# Patient Record
Sex: FEMALE | Race: WHITE | NOT HISPANIC OR LATINO | URBAN - METROPOLITAN AREA
[De-identification: names, ages, dates, MRNs, and addresses within clinical notes are randomized per-mention and may not be internally consistent; named-entity substitution may affect disease eponyms.]

---

## 2025-07-05 ENCOUNTER — OFFICE VISIT (OUTPATIENT)
Dept: URGENT CARE | Age: 60
End: 2025-07-05
Payer: COMMERCIAL

## 2025-07-05 VITALS
TEMPERATURE: 98.3 F | DIASTOLIC BLOOD PRESSURE: 66 MMHG | HEART RATE: 87 BPM | SYSTOLIC BLOOD PRESSURE: 130 MMHG | BODY MASS INDEX: 18.78 KG/M2 | OXYGEN SATURATION: 99 % | WEIGHT: 110 LBS | RESPIRATION RATE: 16 BRPM | HEIGHT: 64 IN

## 2025-07-05 DIAGNOSIS — B02.9 HERPES ZOSTER WITHOUT COMPLICATION: Primary | ICD-10-CM

## 2025-07-05 RX ORDER — VALACYCLOVIR HYDROCHLORIDE 1 G/1
1000 TABLET, FILM COATED ORAL 3 TIMES DAILY
Qty: 21 TABLET | Refills: 0 | Status: SHIPPED | OUTPATIENT
Start: 2025-07-05 | End: 2025-07-12

## 2025-07-05 RX ORDER — IBUPROFEN 600 MG/1
TABLET, FILM COATED ORAL
COMMUNITY
Start: 2025-02-20

## 2025-07-05 ASSESSMENT — PAIN SCALES - GENERAL: PAINLEVEL_OUTOF10: 6

## 2025-07-05 NOTE — PROGRESS NOTES
"Subjective   Patient ID: Isidro Finn is a 59 y.o. female who is here with her . She presents today with a chief complaint of Other (Insect bites that are getting more and more painful, swelling and redness x 11 days). Pain, no pruritus, and fatigue; no other symptoms are reported. She has been applying cortisone cream which is not helping.    History of Present Illness  HPI    Past Medical History  Allergies as of 07/05/2025 - Reviewed 07/05/2025   Allergen Reaction Noted    Sulfa (sulfonamide antibiotics) Other and Unknown 11/19/2022       Prescriptions Prior to Admission[1]     Medical History[2]    Surgical History[3]     reports that she has been smoking cigarettes. She has never used smokeless tobacco.    Review of Systems  Review of Systems                               Objective    Vitals:    07/05/25 1828   BP: 130/66   Pulse: 87   Resp: 16   Temp: 36.8 °C (98.3 °F)   TempSrc: Oral   SpO2: 99%   Weight: 49.9 kg (110 lb)   Height: 1.626 m (5' 4\")     No LMP recorded.    Physical Exam  Vitals and nursing note reviewed.   Constitutional:       General: She is not in acute distress.     Appearance: Normal appearance.   HENT:      Nose: Nose normal.      Mouth/Throat:      Mouth: Mucous membranes are moist.      Pharynx: Oropharynx is clear.   Eyes:      Extraocular Movements: Extraocular movements intact.      Conjunctiva/sclera: Conjunctivae normal.      Pupils: Pupils are equal, round, and reactive to light.   Cardiovascular:      Rate and Rhythm: Normal rate and regular rhythm.      Pulses: Normal pulses.      Heart sounds: Normal heart sounds.   Pulmonary:      Effort: Pulmonary effort is normal.      Breath sounds: Normal breath sounds.   Musculoskeletal:         General: Normal range of motion.      Cervical back: Normal range of motion and neck supple. No rigidity or tenderness.   Lymphadenopathy:      Cervical: No cervical adenopathy.   Skin:     General: Skin is warm.      Comments: " Erythematous vesicles, +tender to palpation, L T7 dermatome   Neurological:      Mental Status: She is alert.         Procedures    Point of Care Test & Imaging Results from this visit  No results found for this visit on 07/05/25.   Imaging  No results found.    Cardiology, Vascular, and Other Imaging  No other imaging results found for the past 2 days      Diagnostic study results (if any) were reviewed by Toya Bender MD.    Assessment/Plan   Allergies, medications, history, and pertinent labs/EKGs/Imaging reviewed by Toya Bender MD.     Medical Decision Making      Orders and Diagnoses  There are no diagnoses linked to this encounter.    Medical Admin Record      Patient disposition: Home    Electronically signed by Toya Bender MD  6:43 PM           [1] (Not in a hospital admission)  [2]   Past Medical History:  Diagnosis Date    Abnormal levels of other serum enzymes 02/13/2017    Abnormal liver enzymes    Acute sinusitis, unspecified 07/10/2018    Acute sinusitis    Acute sinusitis, unspecified 07/10/2018    Acute sinusitis    Acute sinusitis, unspecified 07/10/2018    Acute sinusitis    Alcoholic hepatitis without ascites (Multi) 06/17/2016    Alcoholic hepatitis    Anal abscess 07/10/2018    Perianal abscess    Anal abscess 07/10/2018    Perianal abscess    Anal abscess 07/10/2018    Perianal abscess    Anal abscess 10/26/2017    Perianal abscess    Chalazion right lower eyelid 07/10/2018    Chalazion of right lower eyelid    Cough, unspecified 07/10/2018    Cough    Cough, unspecified 07/10/2018    Cough    Cough, unspecified 07/10/2018    Cough    Hypermetropia, unspecified eye 12/08/2015    Hyperopia    Laceration without foreign body of unspecified hand, initial encounter 03/23/2017    Cut of hand    Other conditions influencing health status 12/29/2017    History of cough    Other malaise 07/10/2018    Malaise and fatigue    Other malaise 07/10/2018    Malaise and fatigue    Other malaise  07/10/2018    Malaise and fatigue    Periorbital cellulitis 07/10/2018    Preseptal cellulitis of left upper eyelid    Personal history of other diseases of the circulatory system 02/11/2017    History of atrial tachycardia    Personal history of other diseases of the digestive system 04/19/2017    History of alcoholic gastritis    Personal history of other diseases of the female genital tract 05/15/2017    History of amenorrhea    Personal history of other diseases of the respiratory system 12/29/2017    History of acute sinusitis    Personal history of other diseases of the respiratory system 04/19/2017    History of acute bronchitis with bronchospasm    Personal history of other infectious and parasitic diseases 10/26/2016    History of candidiasis of mouth    Personal history of other specified conditions 07/30/2014    History of nausea and vomiting    Personal history of urinary (tract) infections 12/18/2014    History of urinary tract infection    Personal history of urinary (tract) infections 08/04/2014    History of acute cystitis    Pinguecula, 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

## 2025-07-05 NOTE — PATIENT INSTRUCTIONS
Valacyclovir as directed  Tylenol as needed  Follow up with your Primary Care Provider in 3 to 5 days